# Patient Record
Sex: FEMALE | Race: WHITE | URBAN - METROPOLITAN AREA
[De-identification: names, ages, dates, MRNs, and addresses within clinical notes are randomized per-mention and may not be internally consistent; named-entity substitution may affect disease eponyms.]

---

## 2022-05-11 ENCOUNTER — WEB ENCOUNTER (OUTPATIENT)
Dept: URBAN - METROPOLITAN AREA CLINIC 12 | Facility: CLINIC | Age: 47
End: 2022-05-11

## 2022-05-11 ENCOUNTER — OFFICE VISIT (OUTPATIENT)
Dept: URBAN - METROPOLITAN AREA CLINIC 12 | Facility: CLINIC | Age: 47
End: 2022-05-11
Payer: COMMERCIAL

## 2022-05-11 VITALS
HEIGHT: 65 IN | DIASTOLIC BLOOD PRESSURE: 57 MMHG | SYSTOLIC BLOOD PRESSURE: 101 MMHG | TEMPERATURE: 98 F | HEART RATE: 71 BPM | BODY MASS INDEX: 27.32 KG/M2 | WEIGHT: 164 LBS

## 2022-05-11 DIAGNOSIS — Z12.11 SCREEN FOR COLON CANCER: ICD-10-CM

## 2022-05-11 DIAGNOSIS — K59.09 CHRONIC CONSTIPATION: ICD-10-CM

## 2022-05-11 PROCEDURE — 99243 OFF/OP CNSLTJ NEW/EST LOW 30: CPT | Performed by: INTERNAL MEDICINE

## 2022-05-11 NOTE — HPI-TODAY'S VISIT:
47 yo  female    presenting for evalution for colon cancer screening referred by Jing [facility]. A copy of this note will be sent to the referring physician.   prior colonoscopy- 12 years ago.  had this because of chronic constipation bt was recommended 5 years -no clear family history but suspect colon cancer   no change in bowel movements, bleeding, abdominal pain, weight loss.   -the constioation is baseline for her not new.she has a bm daily but tends to be small [place] when she goes , other times 3-4 days.  she doesn't tend to use anything regularly- had discussed use of miralax with prior gi.   -denies any prior difficulty with colonoscopy  -denies prior difficulty with anesthesia  denies blood thinners denies hx of heart or lung disease  denies abdominal surgeries -denies any hx of issues with kidney

## 2022-07-15 ENCOUNTER — OFFICE VISIT (OUTPATIENT)
Dept: URBAN - METROPOLITAN AREA LAB 3 | Facility: LAB | Age: 47
End: 2022-07-15

## 2022-09-09 ENCOUNTER — OFFICE VISIT (OUTPATIENT)
Dept: URBAN - METROPOLITAN AREA LAB 3 | Facility: LAB | Age: 47
End: 2022-09-09
Payer: COMMERCIAL

## 2022-09-09 DIAGNOSIS — Z12.11 COLON CANCER SCREENING: ICD-10-CM

## 2022-09-09 PROCEDURE — G0121 COLON CA SCRN NOT HI RSK IND: HCPCS | Performed by: INTERNAL MEDICINE

## 2023-02-01 ENCOUNTER — WEB ENCOUNTER (OUTPATIENT)
Dept: URBAN - METROPOLITAN AREA CLINIC 12 | Facility: CLINIC | Age: 48
End: 2023-02-01

## 2023-02-01 ENCOUNTER — OFFICE VISIT (OUTPATIENT)
Dept: URBAN - METROPOLITAN AREA CLINIC 12 | Facility: CLINIC | Age: 48
End: 2023-02-01
Payer: COMMERCIAL

## 2023-02-01 ENCOUNTER — DASHBOARD ENCOUNTERS (OUTPATIENT)
Age: 48
End: 2023-02-01

## 2023-02-01 VITALS
DIASTOLIC BLOOD PRESSURE: 63 MMHG | SYSTOLIC BLOOD PRESSURE: 97 MMHG | HEIGHT: 65 IN | TEMPERATURE: 97.3 F | BODY MASS INDEX: 27.32 KG/M2 | WEIGHT: 164 LBS | HEART RATE: 75 BPM

## 2023-02-01 DIAGNOSIS — R10.13 EPIGASTRIC PAIN: ICD-10-CM

## 2023-02-01 DIAGNOSIS — Z12.11 SCREEN FOR COLON CANCER: ICD-10-CM

## 2023-02-01 DIAGNOSIS — K59.09 CHRONIC CONSTIPATION: ICD-10-CM

## 2023-02-01 PROBLEM — 236069009: Status: ACTIVE | Noted: 2022-05-11

## 2023-02-01 PROCEDURE — 99214 OFFICE O/P EST MOD 30 MIN: CPT | Performed by: INTERNAL MEDICINE

## 2023-02-01 NOTE — HPI-TODAY'S VISIT:
48 yo female presenting for evaluation for upper abdominal discomfort, pressure and nausea -had egd at 23 , didn't find anything at that time- she has had the sx since at least the age of 18 it is chronic but it is intermittent, she states that the episodes happen 6 times a year at least -she tracks what she eats, states that there is no secific trigger that she can identify -both of her parents have gerd -reports that it happens in [place] daytime and nighttime, she has fullness lack of hinger during these episodes.   the episodes last for days when they happen.  feels bad for about 2 weeks if she pushes in the epigastric area -she reports that she has never had an us of her gallbladder -she denies any changes in her weight, possibly about 2 pounds a year -appetite has been ok ,but just feels full  -takes simethicone during the episodes as well as tums , she doesn't feel like this works well. it doesn't take awsay the sx -did still feel this episode happened even whe she is emptying

## 2023-04-28 ENCOUNTER — OFFICE VISIT (OUTPATIENT)
Dept: URBAN - METROPOLITAN AREA LAB 3 | Facility: LAB | Age: 48
End: 2023-04-28